# Patient Record
Sex: MALE | Race: WHITE | NOT HISPANIC OR LATINO | Employment: STUDENT | URBAN - METROPOLITAN AREA
[De-identification: names, ages, dates, MRNs, and addresses within clinical notes are randomized per-mention and may not be internally consistent; named-entity substitution may affect disease eponyms.]

---

## 2024-08-06 ENCOUNTER — OFFICE VISIT (OUTPATIENT)
Dept: FAMILY MEDICINE CLINIC | Facility: CLINIC | Age: 19
End: 2024-08-06
Payer: COMMERCIAL

## 2024-08-06 VITALS
DIASTOLIC BLOOD PRESSURE: 70 MMHG | HEIGHT: 71 IN | BODY MASS INDEX: 23.21 KG/M2 | SYSTOLIC BLOOD PRESSURE: 132 MMHG | HEART RATE: 81 BPM | OXYGEN SATURATION: 98 % | TEMPERATURE: 98.5 F | RESPIRATION RATE: 16 BRPM | WEIGHT: 165.8 LBS

## 2024-08-06 DIAGNOSIS — R05.8 UPPER AIRWAY COUGH SYNDROME: Primary | ICD-10-CM

## 2024-08-06 PROCEDURE — 99213 OFFICE O/P EST LOW 20 MIN: CPT | Performed by: INTERNAL MEDICINE

## 2024-08-06 RX ORDER — FLUTICASONE PROPIONATE 50 MCG
2 SPRAY, SUSPENSION (ML) NASAL DAILY
Qty: 15.8 ML | Refills: 0 | Status: SHIPPED | OUTPATIENT
Start: 2024-08-06 | End: 2024-08-12 | Stop reason: SDUPTHER

## 2024-08-06 NOTE — PROGRESS NOTES
"  Chief Complaint   Patient presents with    Cough   History of Present Illness:  Patient is here today for follow up of chronic cough & PFT review. He still has intermittent cough but not as frequent as before associated with nasal congestion especially in the morning that goes away after couple of minutes. He states cough can be worse when exposed to cough environment or after exercise. PFT reviewed does not show any sign of reactive airway disease. His CXR was unremarkable and he has tried PPI for possible GERD without any significant improvement.    PMH:   Outpatient Medications Prior to Visit   Medication Sig Dispense Refill    tretinoin (RETIN-A) 0.05 % cream       omeprazole (priLOSEC) 40 MG capsule Take 1 capsule by mouth Daily. (Patient not taking: Reported on 8/6/2024)       No facility-administered medications prior to visit.      No Known Allergies  Past Surgical History:   Procedure Laterality Date    WISDOM TOOTH EXTRACTION Bilateral      family history includes Cancer in his maternal grandfather; No Known Problems in his father, maternal grandmother, mother, paternal grandfather, and paternal grandmother.   reports that he has never smoked. He has never been exposed to tobacco smoke. He has never used smokeless tobacco. He reports that he does not drink alcohol and does not use drugs.     /70 (BP Location: Right arm, Patient Position: Sitting, Cuff Size: Adult)   Pulse 81   Temp 98.5 °F (36.9 °C) (Oral)   Resp 16   Ht 180.3 cm (70.98\")   Wt 75.2 kg (165 lb 12.8 oz)   SpO2 98%   BMI 23.13 kg/m²   Physical Exam  Constitutional:       Appearance: Normal appearance.   HENT:      Head: Normocephalic and atraumatic.   Cardiovascular:      Heart sounds: Normal heart sounds.   Pulmonary:      Effort: Pulmonary effort is normal. No respiratory distress.      Breath sounds: Normal breath sounds. No wheezing or rhonchi.   Neurological:      Mental Status: He is alert and oriented to person, place, " and time.          The following data was reviewed by: Abril Colmenares MD on 08/06/2024:    Data reviewed : PFT pre & post bronchodilator was normal, there are no signs of reactive airway disease       Diagnoses and all orders for this visit:    1. Upper airway cough syndrome (Primary)  -     fluticasone (FLONASE) 50 MCG/ACT nasal spray; 2 sprays into the nostril(s) as directed by provider Daily.  Dispense: 15.8 mL; Refill: 0    No significant improvement after trial of PPI, no significant response to bronchodilator challenge on PFT ruling out asthma, CXR negative for any active lung parenchyma disease. Symptoms most like due to upper airway cough syndrome. Will treat with intranasal corticosteroids. RTC if symptoms worsens.        Return for as needed.

## 2024-08-12 ENCOUNTER — PATIENT MESSAGE (OUTPATIENT)
Dept: FAMILY MEDICINE CLINIC | Facility: CLINIC | Age: 19
End: 2024-08-12
Payer: COMMERCIAL

## 2024-08-12 DIAGNOSIS — R05.8 UPPER AIRWAY COUGH SYNDROME: ICD-10-CM

## 2024-08-12 RX ORDER — FLUTICASONE PROPIONATE 50 MCG
2 SPRAY, SUSPENSION (ML) NASAL DAILY
Qty: 15.8 ML | Refills: 0 | Status: SHIPPED | OUTPATIENT
Start: 2024-08-12

## 2024-08-12 NOTE — TELEPHONE ENCOUNTER
From: Shahzad Lim  To: Abril Colmenares  Sent: 8/12/2024 9:14 AM EDT  Subject: Prescription    Hi doctor,  I went to my pharmacy ([facility] ) and they said they had not received a prescription under my name. Can you confirm you sent it?

## 2024-10-02 DIAGNOSIS — R05.8 UPPER AIRWAY COUGH SYNDROME: ICD-10-CM

## 2024-10-03 RX ORDER — FLUTICASONE PROPIONATE 50 UG/1
2 SPRAY, METERED NASAL DAILY
Qty: 15.8 ML | Refills: 0 | Status: SHIPPED | OUTPATIENT
Start: 2024-10-03

## 2024-10-03 NOTE — TELEPHONE ENCOUNTER
Rx Refill Note  Requested Prescriptions     Pending Prescriptions Disp Refills    fluticasone (FLONASE) 50 MCG/ACT nasal spray 15.8 mL 0     Sig: Administer 2 sprays into the nostril(s) as directed by provider Daily.      Last office visit with prescribing clinician: 8/6/2024   Last telemedicine visit with prescribing clinician: Visit date not found   Next office visit with prescribing clinician: Visit date not found                         Would you like a call back once the refill request has been completed: [] Yes [] No    If the office needs to give you a call back, can they leave a voicemail: [] Yes [] No    Lisa Villa MA  10/03/24, 08:45 EDT

## 2024-11-12 DIAGNOSIS — R05.8 UPPER AIRWAY COUGH SYNDROME: ICD-10-CM

## 2024-11-13 RX ORDER — FLUTICASONE PROPIONATE 50 MCG
2 SPRAY, SUSPENSION (ML) NASAL DAILY
Qty: 16 G | Refills: 0 | Status: SHIPPED | OUTPATIENT
Start: 2024-11-13

## 2024-12-21 DIAGNOSIS — R05.8 UPPER AIRWAY COUGH SYNDROME: ICD-10-CM

## 2024-12-23 RX ORDER — FLUTICASONE PROPIONATE 50 MCG
2 SPRAY, SUSPENSION (ML) NASAL DAILY
Qty: 16 G | Refills: 0 | Status: SHIPPED | OUTPATIENT
Start: 2024-12-23

## 2025-02-07 DIAGNOSIS — R05.8 UPPER AIRWAY COUGH SYNDROME: ICD-10-CM

## 2025-02-07 RX ORDER — FLUTICASONE PROPIONATE 50 MCG
2 SPRAY, SUSPENSION (ML) NASAL DAILY
Qty: 16 G | Refills: 0 | Status: SHIPPED | OUTPATIENT
Start: 2025-02-07

## 2025-03-18 DIAGNOSIS — R05.8 UPPER AIRWAY COUGH SYNDROME: ICD-10-CM

## 2025-03-19 RX ORDER — FLUTICASONE PROPIONATE 50 MCG
2 SPRAY, SUSPENSION (ML) NASAL DAILY
Qty: 16 G | Refills: 0 | Status: SHIPPED | OUTPATIENT
Start: 2025-03-19

## 2025-05-16 ENCOUNTER — OFFICE VISIT (OUTPATIENT)
Dept: SURGERY | Facility: CLINIC | Age: 20
End: 2025-05-16
Payer: COMMERCIAL

## 2025-05-16 VITALS
SYSTOLIC BLOOD PRESSURE: 118 MMHG | WEIGHT: 164 LBS | HEART RATE: 69 BPM | HEIGHT: 71 IN | BODY MASS INDEX: 22.96 KG/M2 | DIASTOLIC BLOOD PRESSURE: 76 MMHG | OXYGEN SATURATION: 94 %

## 2025-05-16 DIAGNOSIS — L08.89 PILONIDAL DISEASE OF NATAL CLEFT: Primary | ICD-10-CM

## 2025-05-16 RX ORDER — BENZOYL PEROXIDE 10 G/100G
1 SUSPENSION TOPICAL DAILY
Qty: 227 G | Refills: 1 | Status: SHIPPED | OUTPATIENT
Start: 2025-05-16

## 2025-05-16 RX ORDER — CLINDAMYCIN PHOSPHATE 11.9 MG/ML
SOLUTION TOPICAL
Qty: 60 ML | Refills: 1 | Status: SHIPPED | OUTPATIENT
Start: 2025-05-16 | End: 2026-05-16

## 2025-05-16 RX ORDER — CLINDAMYCIN AND BENZOYL PEROXIDE 10; 50 MG/G; MG/G
1 GEL TOPICAL 2 TIMES DAILY
COMMUNITY
Start: 2025-04-22 | End: 2026-04-22

## 2025-05-16 NOTE — PROGRESS NOTES
General Surgery History and Physical      Summary:    Shahzad Sims is a healthy 20 y.o. gentleman referred to me with pilonidal disease without evidence of infection. He first noticed some discomfort and intermittent serosanguineous drainage from the  cleft 4 months ago.  He has no history of pilonidal abscess requiring incision and drainage.  His pilonidal cystic disease is mild and I recommend trial of nonoperative management including scrubbing with antibacterial soap, benzyl peroxide, and topical clindamycin, as well as laser hair removal before moving forward with pilonidal cystectomy.  Prescriptions for these medications sent to his pharmacy.  We discussed the details of pilonidal cystectomy with karydakis flap or cleft lift procedure, associated risks of substantial postoperative pain, wound infection, and delayed wound healing, as well as postoperative expectations.  I will see him back in my office in 3 months to assess how his pilonidal disease is responded to nonoperative management.      History of Present Illness:    Shahzad Sims is a 20 y.o. gentleman with no significant medical history aside from acne who recently graduated college at [employer] and is a .  He first noted intermittent clear to bloody drainage from his brook cleft with some induration in January.  He was treated for suspected infection with oral antibiotics, however is never had a pilonidal abscess requiring incision and drainage.  There are 2 draining sinuses and a fair amount of hair in the  cleft.  There is no significant induration, no tenderness, overlying erythema or purulent drainage.  He does have a small amount of serous drainage.  Sitting is not uncomfortable for him.  He denies fever.  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  He is not a smoker, does not have diabetes, and is not obese.  He has  not tried hair removal or topical clindamycin/benzyl peroxide.    Past Medical History:   Acne  Pilonidal cystic disease      Past Surgical History:    None    Family History:    Noncontributory    Social History:    Tobacco: Former vaping  Alcohol: None      Data reviewed:  I reviewed multiple notes in the patient's record from [facility] in regard to the patient's pilonidal disease    Physical Exam:   Vitals:    25 0850   BP: 118/76   Pulse: 69   SpO2: 94%      General: not sick, awake and alert  Skin: 2 draining sinuses in the  cleft 6 centimeters from the anus, with small amount of serous drainage, no induration or overlying erythema.  Fair amount of body hair in the area.    Body mass index is 22.89 kg/m².   BMI is within normal parameters. No other follow-up for BMI required.       Nils Coronel M.D.  General Surgery  [facility]    [address], [address]  [place], [place], [address]  P: [phone]  F: [phone]

## 2025-05-19 ENCOUNTER — OFFICE VISIT (OUTPATIENT)
Dept: FAMILY MEDICINE CLINIC | Facility: CLINIC | Age: 20
End: 2025-05-19
Payer: COMMERCIAL

## 2025-05-19 VITALS
SYSTOLIC BLOOD PRESSURE: 110 MMHG | OXYGEN SATURATION: 98 % | BODY MASS INDEX: 22.97 KG/M2 | HEART RATE: 77 BPM | DIASTOLIC BLOOD PRESSURE: 68 MMHG | RESPIRATION RATE: 16 BRPM | HEIGHT: 71 IN | TEMPERATURE: 97.5 F | WEIGHT: 164.1 LBS

## 2025-05-19 DIAGNOSIS — Z00.00 ANNUAL PHYSICAL EXAM: Primary | ICD-10-CM

## 2025-05-19 PROBLEM — L70.0 ACNE VULGARIS: Status: ACTIVE | Noted: 2025-05-19

## 2025-05-19 PROBLEM — L05.91 PILONIDAL CYST: Status: ACTIVE | Noted: 2025-05-19

## 2025-05-19 PROCEDURE — 99395 PREV VISIT EST AGE 18-39: CPT | Performed by: INTERNAL MEDICINE

## 2025-05-19 NOTE — ASSESSMENT & PLAN NOTE
Patient up-to-date with age-appropriate vaccination.  Recommended 2024/2025 COVID vaccination this year.  Will check routine labs today, CBC, CMP, lipids, A1c, TSH and UA.  Discussed the importance of maintaining a healthy weight and getting regular exercise.  Educated patient on the benefits of healthy diet.  Advise follow-up annually for wellness exams.

## 2025-05-19 NOTE — PROGRESS NOTES
Subjective   Shahzad Sims is a 20 y.o. male who presents for annual male wellness exam.  Chief Complaint   Patient presents with    Annual Exam     Pt here for physical, wanting labs done, pt is fasting        History of Present Illness  The patient is a 20-year-old male who presents today for an annual physical exam.    He discovered a pilonidal cyst a few months ago while at school. A surgeon there recommended surgery, but he chose to wait until he returned home. Last week, he saw a surgeon in [place] who did not recommend surgery at this time and instead prescribed some creams. The cyst is not currently infected. It was initially infected when he first saw the physician at school, so he started taking antibiotics in 02/2025. Since then, infection has resolved. He is currently managing it conservatively and nonoperatively. He is scheduled to follow up with the surgeon in 3 months.    His cough has improved but persists. He discontinued Flonase spray about a month ago due to concerns about long-term use and because it seemed to cause congestion. The congestion resolved after stopping the spray.    He uses clindamycin gel for acne, which was originally prescribed by a dermatologist. He has not seen a dermatologist in a couple of years but used to go when he was in high school about 5 years ago. His acne is improving with the current treatment.    He maintains a healthy diet and exercise routine. He has a dental exam scheduled for tomorrow and an eye exam scheduled in 2 weeks. His last eye exam was in 03/2024. He reports no joint pain or swelling.     SOCIAL HISTORY  He does not smoke. He consumes alcohol moderately, typically 3 to 4 drinks on weekends while in college.      Immunization History   Administered Date(s) Administered    Hpv9 01/31/2017, 07/17/2017       The following portions of the patient's history were reviewed and updated as appropriate: allergies, current medications, past family history,  past medical history, past social history, past surgical history and problem list.    Past Medical History:   Diagnosis Date    Infectious mononucleosis        Past Surgical History:   Procedure Laterality Date    WISDOM TOOTH EXTRACTION Bilateral        Family History   Problem Relation Age of Onset    No Known Problems Mother     No Known Problems Father     No Known Problems Maternal Grandmother     Cancer Maternal Grandfather     No Known Problems Paternal Grandmother     Cancer Paternal Grandfather         Pancreatic       Social History     Socioeconomic History    Marital status: Single   Tobacco Use    Smoking status: Never     Passive exposure: Never    Smokeless tobacco: Never   Vaping Use    Vaping status: Former    Start date: 1/1/2022    Quit date: 5/1/2024    Substances: Nicotine    Devices: Disposable    Passive vaping exposure: Yes   Substance and Sexual Activity    Alcohol use: Never    Drug use: Never    Sexual activity: Defer         Current Outpatient Medications:     benzoyl peroxide 10 % liquid, Apply 1 Application topically Daily., Disp: 227 g, Rfl: 1    clindamycin (CLEOCIN T) 1 % external solution, Apply to affected area 2 times daily, Disp: 60 mL, Rfl: 1    clindamycin-benzoyl peroxide (BENZACLIN) 1-5 % gel, Apply 1 Application topically to the appropriate area as directed 2 (Two) Times a Day., Disp: , Rfl:     fluticasone (FLONASE) 50 MCG/ACT nasal spray, Administer 2 sprays into the nostril(s) as directed by provider Daily., Disp: 16 g, Rfl: 0    tretinoin (RETIN-A) 0.05 % cream, , Disp: , Rfl:       Objective   Vitals:    05/19/25 1047   BP: 110/68   Pulse: 77   Resp: 16   Temp: 97.5 °F (36.4 °C)   SpO2: 98%     Body mass index is 22.9 kg/m².  Physical Exam  Constitutional:       Appearance: Normal appearance.   HENT:      Head: Normocephalic and atraumatic.      Mouth/Throat:      Mouth: Mucous membranes are moist.   Eyes:      Conjunctiva/sclera: Conjunctivae normal.   Cardiovascular:       Rate and Rhythm: Normal rate and regular rhythm.      Pulses: Normal pulses.      Heart sounds: Normal heart sounds.   Pulmonary:      Effort: Pulmonary effort is normal.      Breath sounds: Normal breath sounds.   Abdominal:      General: Bowel sounds are normal. There is no distension.      Palpations: Abdomen is soft.      Tenderness: There is no abdominal tenderness.   Neurological:      General: No focal deficit present.      Mental Status: He is alert and oriented to person, place, and time.   Psychiatric:         Mood and Affect: Mood normal.                    Assessment & Plan   Diagnoses and all orders for this visit:    1. Annual physical exam (Primary)  Assessment & Plan:  Patient up-to-date with age-appropriate vaccination.  Recommended 2024/2025 COVID vaccination this year.  Will check routine labs today, CBC, CMP, lipids, A1c, TSH and UA.  Discussed the importance of maintaining a healthy weight and getting regular exercise.  Educated patient on the benefits of healthy diet.  Advise follow-up annually for wellness exams.    Orders:  -     CBC & Differential  -     Comprehensive Metabolic Panel  -     Hemoglobin A1c  -     TSH Rfx On Abnormal To Free T4  -     Lipid Panel With / Chol / HDL Ratio  -     Urinalysis With Microscopic - Urine, Clean Catch         Assessment & Plan                 Patient or patient representative verbalized consent for the use of Ambient Listening during the visit with  Abril Colmenares MD for chart documentation. 5/19/2025  11:18 EDT

## 2025-05-20 LAB
ALBUMIN SERPL-MCNC: 5 G/DL (ref 3.5–5.2)
ALBUMIN/GLOB SERPL: 1.9 G/DL
ALP SERPL-CCNC: 71 U/L (ref 39–117)
ALT SERPL-CCNC: 31 U/L (ref 1–41)
APPEARANCE UR: CLEAR
AST SERPL-CCNC: 25 U/L (ref 1–40)
BACTERIA #/AREA URNS HPF: NORMAL /HPF
BASOPHILS # BLD AUTO: 0.05 10*3/MM3 (ref 0–0.2)
BASOPHILS NFR BLD AUTO: 0.7 % (ref 0–1.5)
BILIRUB SERPL-MCNC: 1.9 MG/DL (ref 0–1.2)
BILIRUB UR QL STRIP: NEGATIVE
BUN SERPL-MCNC: 13 MG/DL (ref 6–20)
BUN/CREAT SERPL: 13.3 (ref 7–25)
CALCIUM SERPL-MCNC: 9.8 MG/DL (ref 8.6–10.5)
CASTS URNS MICRO: NORMAL
CHLORIDE SERPL-SCNC: 101 MMOL/L (ref 98–107)
CHOLEST SERPL-MCNC: 151 MG/DL (ref 0–200)
CHOLEST/HDLC SERPL: 2.7 {RATIO}
CO2 SERPL-SCNC: 25.9 MMOL/L (ref 22–29)
COLOR UR: YELLOW
CREAT SERPL-MCNC: 0.98 MG/DL (ref 0.76–1.27)
EGFRCR SERPLBLD CKD-EPI 2021: 112.5 ML/MIN/1.73
EOSINOPHIL # BLD AUTO: 0.22 10*3/MM3 (ref 0–0.4)
EOSINOPHIL NFR BLD AUTO: 3.1 % (ref 0.3–6.2)
EPI CELLS #/AREA URNS HPF: NORMAL /HPF
ERYTHROCYTE [DISTWIDTH] IN BLOOD BY AUTOMATED COUNT: 12.6 % (ref 12.3–15.4)
GLOBULIN SER CALC-MCNC: 2.6 GM/DL
GLUCOSE SERPL-MCNC: 88 MG/DL (ref 65–99)
GLUCOSE UR QL STRIP: NEGATIVE
HBA1C MFR BLD: 5.3 % (ref 4.8–5.6)
HCT VFR BLD AUTO: 45.5 % (ref 37.5–51)
HDLC SERPL-MCNC: 56 MG/DL (ref 40–60)
HGB BLD-MCNC: 15.6 G/DL (ref 13–17.7)
HGB UR QL STRIP: NEGATIVE
IMM GRANULOCYTES # BLD AUTO: 0.02 10*3/MM3 (ref 0–0.05)
IMM GRANULOCYTES NFR BLD AUTO: 0.3 % (ref 0–0.5)
KETONES UR QL STRIP: NEGATIVE
LDLC SERPL CALC-MCNC: 74 MG/DL (ref 0–100)
LEUKOCYTE ESTERASE UR QL STRIP: NEGATIVE
LYMPHOCYTES # BLD AUTO: 2.51 10*3/MM3 (ref 0.7–3.1)
LYMPHOCYTES NFR BLD AUTO: 35.7 % (ref 19.6–45.3)
MCH RBC QN AUTO: 29.7 PG (ref 26.6–33)
MCHC RBC AUTO-ENTMCNC: 34.3 G/DL (ref 31.5–35.7)
MCV RBC AUTO: 86.5 FL (ref 79–97)
MONOCYTES # BLD AUTO: 0.62 10*3/MM3 (ref 0.1–0.9)
MONOCYTES NFR BLD AUTO: 8.8 % (ref 5–12)
NEUTROPHILS # BLD AUTO: 3.62 10*3/MM3 (ref 1.7–7)
NEUTROPHILS NFR BLD AUTO: 51.4 % (ref 42.7–76)
NITRITE UR QL STRIP: NEGATIVE
NRBC BLD AUTO-RTO: 0 /100 WBC (ref 0–0.2)
PH UR STRIP: 7.5 [PH] (ref 5–8)
PLATELET # BLD AUTO: 231 10*3/MM3 (ref 140–450)
POTASSIUM SERPL-SCNC: 4.3 MMOL/L (ref 3.5–5.2)
PROT SERPL-MCNC: 7.6 G/DL (ref 6–8.5)
PROT UR QL STRIP: NEGATIVE
RBC # BLD AUTO: 5.26 10*6/MM3 (ref 4.14–5.8)
RBC #/AREA URNS HPF: NORMAL /HPF
SODIUM SERPL-SCNC: 139 MMOL/L (ref 136–145)
SP GR UR STRIP: 1.01 (ref 1–1.03)
TRIGL SERPL-MCNC: 117 MG/DL (ref 0–150)
TSH SERPL DL<=0.005 MIU/L-ACNC: 1.05 UIU/ML (ref 0.27–4.2)
UROBILINOGEN UR STRIP-MCNC: NORMAL MG/DL
VLDLC SERPL CALC-MCNC: 21 MG/DL (ref 5–40)
WBC # BLD AUTO: 7.04 10*3/MM3 (ref 3.4–10.8)
WBC #/AREA URNS HPF: NORMAL /HPF

## 2025-07-17 RX ORDER — CLINDAMYCIN PHOSPHATE 11.9 MG/ML
SOLUTION TOPICAL
Qty: 60 ML | Refills: 1 | Status: SHIPPED | OUTPATIENT
Start: 2025-07-17 | End: 2026-07-17

## 2025-07-18 ENCOUNTER — TELEPHONE (OUTPATIENT)
Dept: SURGERY | Facility: CLINIC | Age: 20
End: 2025-07-18
Payer: COMMERCIAL

## 2025-07-18 NOTE — TELEPHONE ENCOUNTER
HUB OK TO READ/SCHD    Attempted to reach patient to schedule him a follow up with Dr. Coronel regarding his pilonidal cyst per pt's request. No answer, left VM. Anyone can assist.

## 2025-08-29 ENCOUNTER — OFFICE VISIT (OUTPATIENT)
Dept: SURGERY | Facility: CLINIC | Age: 20
End: 2025-08-29
Payer: COMMERCIAL

## 2025-08-29 VITALS
HEART RATE: 86 BPM | DIASTOLIC BLOOD PRESSURE: 78 MMHG | HEIGHT: 71 IN | RESPIRATION RATE: 12 BRPM | SYSTOLIC BLOOD PRESSURE: 126 MMHG | BODY MASS INDEX: 23.44 KG/M2 | OXYGEN SATURATION: 98 % | WEIGHT: 167.4 LBS

## 2025-08-29 DIAGNOSIS — L08.89 PILONIDAL DISEASE OF NATAL CLEFT: Primary | ICD-10-CM

## 2025-08-29 RX ORDER — SODIUM FLUORIDE AND POTASSIUM NITRATE 5.8; 57.5 MG/ML; MG/ML
GEL, DENTIFRICE DENTAL
COMMUNITY
Start: 2025-05-20